# Patient Record
Sex: MALE | Race: BLACK OR AFRICAN AMERICAN | NOT HISPANIC OR LATINO | URBAN - METROPOLITAN AREA
[De-identification: names, ages, dates, MRNs, and addresses within clinical notes are randomized per-mention and may not be internally consistent; named-entity substitution may affect disease eponyms.]

---

## 2020-01-02 ENCOUNTER — OUTPATIENT (OUTPATIENT)
Dept: OUTPATIENT SERVICES | Facility: HOSPITAL | Age: 45
LOS: 1 days | Discharge: HOME | End: 2020-01-02
Payer: MEDICAID

## 2020-01-02 VITALS
TEMPERATURE: 97 F | SYSTOLIC BLOOD PRESSURE: 143 MMHG | OXYGEN SATURATION: 100 % | HEIGHT: 75 IN | DIASTOLIC BLOOD PRESSURE: 75 MMHG | HEART RATE: 60 BPM | RESPIRATION RATE: 19 BRPM | WEIGHT: 315 LBS

## 2020-01-02 DIAGNOSIS — Z98.52 VASECTOMY STATUS: Chronic | ICD-10-CM

## 2020-01-02 DIAGNOSIS — R19.5 OTHER FECAL ABNORMALITIES: ICD-10-CM

## 2020-01-02 DIAGNOSIS — Z98.890 OTHER SPECIFIED POSTPROCEDURAL STATES: Chronic | ICD-10-CM

## 2020-01-02 PROCEDURE — 88305 TISSUE EXAM BY PATHOLOGIST: CPT | Mod: 26

## 2020-01-02 PROCEDURE — 88305 TISSUE EXAM BY PATHOLOGIST: CPT

## 2020-01-02 RX ORDER — SERTRALINE 25 MG/1
150 TABLET, FILM COATED ORAL
Qty: 0 | Refills: 0 | DISCHARGE

## 2020-01-05 DIAGNOSIS — K62.5 HEMORRHAGE OF ANUS AND RECTUM: ICD-10-CM

## 2020-01-05 DIAGNOSIS — M19.90 UNSPECIFIED OSTEOARTHRITIS, UNSPECIFIED SITE: ICD-10-CM

## 2020-01-05 DIAGNOSIS — E78.5 HYPERLIPIDEMIA, UNSPECIFIED: ICD-10-CM

## 2020-01-05 DIAGNOSIS — G47.33 OBSTRUCTIVE SLEEP APNEA (ADULT) (PEDIATRIC): ICD-10-CM

## 2020-01-05 DIAGNOSIS — K62.1 RECTAL POLYP: ICD-10-CM

## 2020-01-05 DIAGNOSIS — F43.10 POST-TRAUMATIC STRESS DISORDER, UNSPECIFIED: ICD-10-CM

## 2020-01-05 DIAGNOSIS — K63.5 POLYP OF COLON: ICD-10-CM

## 2021-02-12 NOTE — ASU PREOP CHECKLIST - WARM FLUIDS/WARM BLANKETS
IR RN note    Patient underwent a Bilateral Nephrostomy Tube Placement by Dr. Villa.  Procedure site was verified by MD using imaging guidance. Consent was signed.  IR Raquel Murillo and Carri assisted. Patient was placed in a Prone position.  Vitals were taken every 5 minutes and remained stable during procedure (see doc flow sheet for results).  CO2 waveform capnography was monitored throughout procedure, see chart.  Right and Left Flank Back sites.   A Sutured, gauze and medical tape dressing was placed over surgical site. Report called to Alek ZUNIGA. Pt transported by chioma with RN to T604, with monitor .   Reviewed sedation orders with MD      [employer] Regular Nephrostomy Catheter System 8 Fr - Left  REF # [id number]  LOT # [id number]  Exp. 9/25/23    Flexima Regular Nephrostomy Catheter System 8 Fr - Right  REF# [id number]  Lot # [id number]  Exp. 9/25/23    Right and Left Kidney fluid sent to lab for culture, gram stain and cell count       no
